# Patient Record
Sex: FEMALE | Race: WHITE | URBAN - METROPOLITAN AREA
[De-identification: names, ages, dates, MRNs, and addresses within clinical notes are randomized per-mention and may not be internally consistent; named-entity substitution may affect disease eponyms.]

---

## 2017-03-11 ENCOUNTER — HOSPITAL ENCOUNTER (EMERGENCY)
Age: 47
Discharge: HOME | End: 2017-03-11
Attending: EMERGENCY MEDICINE
Payer: MEDICARE

## 2017-03-11 VITALS
SYSTOLIC BLOOD PRESSURE: 140 MMHG | HEART RATE: 79 BPM | OXYGEN SATURATION: 98 % | WEIGHT: 185 LBS | DIASTOLIC BLOOD PRESSURE: 87 MMHG | TEMPERATURE: 97.5 F | BODY MASS INDEX: 33.84 KG/M2 | RESPIRATION RATE: 16 BRPM

## 2017-03-11 DIAGNOSIS — S61.219A LACERATION OF FINGER, INITIAL ENCOUNTER: Primary | ICD-10-CM

## 2017-03-11 PROCEDURE — 6360000002 HC RX W HCPCS: Performed by: PHYSICIAN ASSISTANT

## 2017-03-11 PROCEDURE — 12002 RPR S/N/AX/GEN/TRNK2.6-7.5CM: CPT

## 2017-03-11 PROCEDURE — 90471 IMMUNIZATION ADMIN: CPT | Performed by: PHYSICIAN ASSISTANT

## 2017-03-11 PROCEDURE — 2500000003 HC RX 250 WO HCPCS: Performed by: PHYSICIAN ASSISTANT

## 2017-03-11 PROCEDURE — 90715 TDAP VACCINE 7 YRS/> IM: CPT | Performed by: PHYSICIAN ASSISTANT

## 2017-03-11 PROCEDURE — 99282 EMERGENCY DEPT VISIT SF MDM: CPT

## 2017-03-11 RX ORDER — CEPHALEXIN 500 MG/1
500 CAPSULE ORAL 2 TIMES DAILY
Qty: 14 CAPSULE | Refills: 0 | Status: SHIPPED | OUTPATIENT
Start: 2017-03-11 | End: 2017-03-18

## 2017-03-11 RX ORDER — LIDOCAINE HYDROCHLORIDE 10 MG/ML
20 INJECTION, SOLUTION INFILTRATION; PERINEURAL ONCE
Status: COMPLETED | OUTPATIENT
Start: 2017-03-11 | End: 2017-03-11

## 2017-03-11 RX ADMIN — LIDOCAINE HYDROCHLORIDE 20 ML: 10 INJECTION, SOLUTION INFILTRATION; PERINEURAL at 19:56

## 2017-03-11 RX ADMIN — TETANUS TOXOID, REDUCED DIPHTHERIA TOXOID AND ACELLULAR PERTUSSIS VACCINE, ADSORBED 0.5 ML: 5; 2.5; 8; 8; 2.5 SUSPENSION INTRAMUSCULAR at 19:56

## 2017-03-11 ASSESSMENT — PAIN SCALES - GENERAL
PAINLEVEL_OUTOF10: 7
PAINLEVEL_OUTOF10: 7

## 2017-03-11 ASSESSMENT — PAIN DESCRIPTION - ORIENTATION: ORIENTATION: LEFT

## 2017-03-11 ASSESSMENT — PAIN DESCRIPTION - PAIN TYPE: TYPE: ACUTE PAIN

## 2017-03-11 ASSESSMENT — PAIN DESCRIPTION - LOCATION: LOCATION: FINGER (COMMENT WHICH ONE)

## 2017-03-11 ASSESSMENT — PAIN DESCRIPTION - DESCRIPTORS: DESCRIPTORS: DISCOMFORT

## 2017-03-11 ASSESSMENT — PAIN DESCRIPTION - FREQUENCY: FREQUENCY: CONTINUOUS

## 2024-01-29 ENCOUNTER — HOSPITAL ENCOUNTER (EMERGENCY)
Age: 54
Discharge: HOME | End: 2024-01-29
Attending: EMERGENCY MEDICINE
Payer: COMMERCIAL

## 2024-01-29 ENCOUNTER — APPOINTMENT (OUTPATIENT)
Dept: GENERAL RADIOLOGY | Age: 54
End: 2024-01-29
Payer: COMMERCIAL

## 2024-01-29 VITALS
WEIGHT: 180 LBS | BODY MASS INDEX: 35.34 KG/M2 | HEART RATE: 84 BPM | TEMPERATURE: 98.6 F | RESPIRATION RATE: 14 BRPM | DIASTOLIC BLOOD PRESSURE: 86 MMHG | OXYGEN SATURATION: 99 % | SYSTOLIC BLOOD PRESSURE: 156 MMHG | HEIGHT: 60 IN

## 2024-01-29 DIAGNOSIS — S59.902A INJURY OF LEFT ELBOW, INITIAL ENCOUNTER: Primary | ICD-10-CM

## 2024-01-29 PROCEDURE — 99283 EMERGENCY DEPT VISIT LOW MDM: CPT

## 2024-01-29 PROCEDURE — 73080 X-RAY EXAM OF ELBOW: CPT

## 2024-01-29 ASSESSMENT — PAIN SCALES - GENERAL: PAINLEVEL_OUTOF10: 4

## 2024-01-29 ASSESSMENT — PAIN - FUNCTIONAL ASSESSMENT: PAIN_FUNCTIONAL_ASSESSMENT: 0-10

## 2024-01-29 NOTE — DISCHARGE INSTRUCTIONS
Recommend ice, rest.  Follow up with dr. Botello.  Return to the ED if you develop worsening pain, swelling, bruising, redness, numbness, fevers, decreased range of motion or any other concerning symptoms.

## 2024-01-29 NOTE — ED PROVIDER NOTES
eMERGENCY dEPARTMENT eNCOUnter   Independent Attestation     Pt Name: Faith Fried  MRN: [id number]  Birthdate 1970  Date of evaluation: 1/29/24     Faith Fried is a 53 y.o. female with CC: Joint Swelling and Arm Pain (Fell approx a week ago and continues to have left elbow pain and swelling)      Based on the medical record the care appears appropriate.  I was personally available for consultation in the Emergency Department.    Christiano Thomas MD  Attending Emergency Physician                  Christiano Thomas MD  01/29/24 5331    
during an unprecedented national emergency due to the novel coronavirus, COVID-19.      ED Medications administered this visit:  Medications - No data to display    New Prescriptions from this visit:    New Prescriptions    No medications on file       Follow-up:  Jesusita Smith MD  [address]  [address]  [facility] [address] [phone]          [facility] ED  [address] [facility] [id number] [phone]        Giovanny Botello MD  [address] [address] [phone]    Call           Final Impression:   1. Injury of left elbow, initial encounter               (Please note that portions of this note were completed with a voice recognition program )        KAREN Pichardo Adrienne C, PA-C  01/29/24 5745

## 2025-02-19 ENCOUNTER — OFFICE VISIT (OUTPATIENT)
Dept: NEUROLOGY | Age: 55
End: 2025-02-19
Payer: COMMERCIAL

## 2025-02-19 VITALS
HEIGHT: 60 IN | BODY MASS INDEX: 36.12 KG/M2 | DIASTOLIC BLOOD PRESSURE: 64 MMHG | SYSTOLIC BLOOD PRESSURE: 107 MMHG | WEIGHT: 184 LBS | HEART RATE: 70 BPM

## 2025-02-19 DIAGNOSIS — G62.9 PERIPHERAL POLYNEUROPATHY: ICD-10-CM

## 2025-02-19 DIAGNOSIS — E11.49 TYPE 2 DIABETES MELLITUS WITH OTHER DIABETIC NEUROLOGICAL COMPLICATION (HCC): ICD-10-CM

## 2025-02-19 DIAGNOSIS — G43.919 INTRACTABLE MIGRAINE WITHOUT STATUS MIGRAINOSUS, UNSPECIFIED MIGRAINE TYPE: Primary | ICD-10-CM

## 2025-02-19 PROCEDURE — 99205 OFFICE O/P NEW HI 60 MIN: CPT | Performed by: STUDENT IN AN ORGANIZED HEALTH CARE EDUCATION/TRAINING PROGRAM

## 2025-02-19 RX ORDER — AMITRIPTYLINE HYDROCHLORIDE 10 MG/1
TABLET ORAL
Qty: 90 TABLET | Refills: 2 | Status: SHIPPED | OUTPATIENT
Start: 2025-02-19 | End: 2025-04-04

## 2025-02-19 RX ORDER — SUMATRIPTAN 50 MG/1
50 TABLET, FILM COATED ORAL
Qty: 9 TABLET | Refills: 3 | Status: SHIPPED | OUTPATIENT
Start: 2025-02-19 | End: 2025-02-19

## 2025-02-19 NOTE — PROGRESS NOTES
normal, and station normal.      IMPRESSION: Faith Fried is a 54 y.o. right handed female with history of alcohol use disorder, borderline diabetes, hypertension, depression/anxiety, history of Bell's palsy (July 2024) who presents with chronic daily headaches with migraine features.  MRI brain shows chronic white matter changes as well as chronic sinusitis.  Neuroexam was nonfocal    -Chronic migraine  -Sinusitis related headache  -Neuropathy is likely secondary to diabetes mellitus/chronic alcoholism    PLAN:  After detailed discussion with Faith Fried we have made following plan.      -Start taking amitriptyline 10 mg nightly and then increase the dose to 20 mg in 2 weeks and continue that    -Take sumatriptan 50 mg at headache onset, may repeat in 2 hours.  Do not take more than 2 tablets in 24 hours    -Limit use of over-the-counter pain medication to avoid rebound headaches/medication overuse headaches    -EMG/nerve conduction studies order to evaluate for neuropathy    - -Start taking Magnesium Oxide, 400 mg daily.  If Insurance does not cover, then buy over the counter or on [employer].  -Start taking Vitamin B2, also called Riboflavin, 400 mg daily.  If Insurance does not cover, then buy over the counter or on [employer].    -MIGRAINE LIFE STYLE MODIFICATIONS:  General rule: Limit use of meds (including over the counter tablets) to 2 days a week and 10 days a month to avoid rebound headache    When you get a headache, Get into a quiet environment and try to relax ,Use cold compresses , Take the full dose of acute medication at the earliest onset of headache   Maintain a headache diary    Get 8 hours of sleep at night     Minimize stress. Try yoga, meditation, Exercise 30 minutes a day     Keep well hydrated and drink 6 to 8 glasses of water per day     Do not skip meals   Avoid certain foods -Caffeine: coffee, chocolate, cola, tea ,MSG: Chinese food, Ramen noodles, Doritos , many Cheeses: mozzarella,

## 2025-02-20 ENCOUNTER — TELEPHONE (OUTPATIENT)
Dept: PHYSICAL MEDICINE AND REHAB | Age: 55
End: 2025-02-20

## 2025-02-20 NOTE — TELEPHONE ENCOUNTER
While reconciling EMG appointments for Dr Dodson to match the hospital's side I always double check the orders to make sure patient's were scheduled right. Could you clarify if were just testing lower extremities for this patient? It looks like that's what you seen her for, but central scheduling notes say all 4 extremities. Please advise. Thank you.

## 2025-03-27 ENCOUNTER — TELEPHONE (OUTPATIENT)
Dept: NEUROLOGY | Age: 55
End: 2025-03-27

## 2025-03-27 NOTE — TELEPHONE ENCOUNTER
03 27 2025 called the patient times 2 (03 13 2025 and 03 20 2025 at ) to schedule follow up appointment with Dr. Collier, left message on machine both times, no response.  I mailed the patient a letter asking them to call the office back to schedule this appointment.  KS

## 2025-03-28 ENCOUNTER — TELEPHONE (OUTPATIENT)
Dept: NEUROLOGY | Age: 55
End: 2025-03-28

## 2025-03-28 NOTE — TELEPHONE ENCOUNTER
03 28 2025 called the patient times 2 (03 11 2025 and 03 18 2025 at ) to schedule follow up appointment with Dr. Collier, left message on machine both times, no response.  I mailed the patient a letter asking them to call the office back to schedule this appointment.  KS